# Patient Record
Sex: FEMALE | Race: BLACK OR AFRICAN AMERICAN | NOT HISPANIC OR LATINO | Employment: UNEMPLOYED | URBAN - NONMETROPOLITAN AREA
[De-identification: names, ages, dates, MRNs, and addresses within clinical notes are randomized per-mention and may not be internally consistent; named-entity substitution may affect disease eponyms.]

---
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
## 2021-07-02 ENCOUNTER — HOSPITAL ENCOUNTER (EMERGENCY)
Facility: HOSPITAL | Age: 21
Discharge: LEFT BEFORE DISCHARGE | End: 2021-07-02

## 2021-07-02 VITALS
OXYGEN SATURATION: 97 % | RESPIRATION RATE: 16 BRPM | HEIGHT: 70 IN | BODY MASS INDEX: 41.95 KG/M2 | DIASTOLIC BLOOD PRESSURE: 83 MMHG | SYSTOLIC BLOOD PRESSURE: 133 MMHG | WEIGHT: 293 LBS | TEMPERATURE: 97.6 F | HEART RATE: 98 BPM

## 2021-07-02 PROCEDURE — 99211 OFF/OP EST MAY X REQ PHY/QHP: CPT

## 2021-08-10 ENCOUNTER — OFFICE VISIT (OUTPATIENT)
Dept: OBSTETRICS AND GYNECOLOGY | Facility: CLINIC | Age: 21
End: 2021-08-10

## 2021-08-10 VITALS
SYSTOLIC BLOOD PRESSURE: 128 MMHG | WEIGHT: 293 LBS | HEIGHT: 70 IN | DIASTOLIC BLOOD PRESSURE: 78 MMHG | BODY MASS INDEX: 41.95 KG/M2

## 2021-08-10 DIAGNOSIS — Z12.4 SCREENING FOR CERVICAL CANCER: ICD-10-CM

## 2021-08-10 DIAGNOSIS — Z11.3 ROUTINE SCREENING FOR STI (SEXUALLY TRANSMITTED INFECTION): ICD-10-CM

## 2021-08-10 DIAGNOSIS — N94.6 DYSMENORRHEA: ICD-10-CM

## 2021-08-10 DIAGNOSIS — Z01.419 ENCOUNTER FOR GYNECOLOGICAL EXAMINATION WITHOUT ABNORMAL FINDING: Primary | ICD-10-CM

## 2021-08-10 DIAGNOSIS — N94.10 DYSPAREUNIA IN FEMALE: ICD-10-CM

## 2021-08-10 PROCEDURE — 99385 PREV VISIT NEW AGE 18-39: CPT | Performed by: PHYSICIAN ASSISTANT

## 2021-08-10 NOTE — PROGRESS NOTES
Subjective   Chief Complaint   Patient presents with   • Gynecologic Exam     Annual and pap, last pap 2018 WNL. C/O pain with periods and sex. Patient would like to discuss having a baby       Renuka Bety Greer is a 20 y.o. year old  presenting to be seen for her annual gynecological exam.   Patient's last menstrual period was 2021.  Patient reports that she has not used any contraception for 3 years and has been unable to conceive.  Her  is 24 years old and reportedly healthy.  She has been experiencing deep dyspareunia since 2018.  Reports that she has moderate to severe dysmenorrhea.  Her menstrual cycles occur every 28 to 30 days most of the time, she states there has been an occasional month that she would have 2 cycles per month.  Her flow lasts for 5 days.  She would like that STI screening with her Pap smear.    Past Medical History:   Diagnosis Date   • Urinary tract infection       No current outpatient medications on file.   No Known Allergies   History reviewed. No pertinent surgical history.   Social History     Socioeconomic History   • Marital status: Single     Spouse name: Not on file   • Number of children: Not on file   • Years of education: Not on file   • Highest education level: Not on file   Tobacco Use   • Smoking status: Never Smoker   • Smokeless tobacco: Never Used   Vaping Use   • Vaping Use: Never used   Substance and Sexual Activity   • Alcohol use: Yes     Comment: socially    • Drug use: Yes     Types: Marijuana   • Sexual activity: Yes     Partners: Male      Family History   Problem Relation Age of Onset   • Diabetes Father    • Hypertension Father    • Stroke Maternal Grandmother    • Prostate cancer Maternal Grandfather        Review of Systems   Constitutional:        Weight gain   Gastrointestinal: Negative.    Genitourinary: Positive for dyspareunia, menstrual problem and pelvic pain. Negative for vaginal discharge.   All other systems reviewed and are  "negative.          Objective   /78   Ht 177.8 cm (70\")   Wt (!) 153 kg (337 lb)   LMP 07/30/2021   Breastfeeding No   BMI 48.35 kg/m²     Physical Exam  Constitutional:       Appearance: Normal appearance. She is well-developed and well-groomed. She is morbidly obese.   Eyes:      General: Lids are normal.      Extraocular Movements: Extraocular movements intact.      Conjunctiva/sclera: Conjunctivae normal.   Neck:      Thyroid: No thyroid mass or thyromegaly.   Chest:      Breasts: Breasts are symmetrical.         Right: No inverted nipple, mass, nipple discharge, skin change or tenderness.         Left: No inverted nipple, mass, nipple discharge, skin change or tenderness.   Abdominal:      General: There is no distension.      Palpations: Abdomen is soft. There is no hepatomegaly or splenomegaly.      Tenderness: There is no abdominal tenderness.   Genitourinary:     Exam position: Lithotomy position.      Labia:         Right: No rash, tenderness or lesion.         Left: No rash, tenderness or lesion.       Urethra: No prolapse, urethral pain, urethral swelling or urethral lesion.      Vagina: No vaginal discharge, tenderness or lesions.      Cervix: No cervical motion tenderness, discharge, friability or lesion.      Uterus: Not enlarged and not tender.       Adnexa:         Right: No mass or tenderness.          Left: No mass or tenderness.     Musculoskeletal:      Cervical back: Neck supple.   Lymphadenopathy:      Upper Body:      Right upper body: No axillary adenopathy.      Left upper body: No axillary adenopathy.   Skin:     General: Skin is warm and dry.      Findings: No lesion.   Neurological:      General: No focal deficit present.      Mental Status: She is alert and oriented to person, place, and time.   Psychiatric:         Attention and Perception: Attention normal.         Mood and Affect: Mood normal.         Speech: Speech normal.         Behavior: Behavior normal.         Thought " Content: Thought content normal.            Result Review :                   Assessment and Plan  Diagnoses and all orders for this visit:    1. Encounter for gynecological examination without abnormal finding (Primary)    2. Screening for cervical cancer  -     Pap IG, Ct-Ng, Rfx HPV ASCU; Future    3. Routine screening for STI (sexually transmitted infection)  -     Pap IG, Ct-Ng, Rfx HPV ASCU; Future    4. Dyspareunia in female  -     US Non-ob Transvaginal    5. Dysmenorrhea  -     US Non-ob Transvaginal      Patient Instructions   Encourage self breast exam monthly  Regular exercise             This note was electronically signed.    Geraldine Ortega PA-C   August 11, 2021

## 2021-08-20 DIAGNOSIS — Z11.3 ROUTINE SCREENING FOR STI (SEXUALLY TRANSMITTED INFECTION): ICD-10-CM

## 2021-08-20 DIAGNOSIS — Z12.4 SCREENING FOR CERVICAL CANCER: ICD-10-CM

## 2021-08-25 ENCOUNTER — OFFICE VISIT (OUTPATIENT)
Dept: OBSTETRICS AND GYNECOLOGY | Facility: CLINIC | Age: 21
End: 2021-08-25

## 2021-08-25 VITALS
SYSTOLIC BLOOD PRESSURE: 134 MMHG | WEIGHT: 293 LBS | HEIGHT: 70 IN | DIASTOLIC BLOOD PRESSURE: 82 MMHG | BODY MASS INDEX: 41.95 KG/M2

## 2021-08-25 DIAGNOSIS — N83.292 COMPLEX CYST OF LEFT OVARY: Primary | ICD-10-CM

## 2021-08-25 DIAGNOSIS — N94.6 DYSMENORRHEA: ICD-10-CM

## 2021-08-25 DIAGNOSIS — N94.10 DYSPAREUNIA IN FEMALE: ICD-10-CM

## 2021-08-25 DIAGNOSIS — Z82.49 FAMILY HISTORY OF DVT: ICD-10-CM

## 2021-08-25 PROCEDURE — 99214 OFFICE O/P EST MOD 30 MIN: CPT | Performed by: PHYSICIAN ASSISTANT

## 2021-08-25 NOTE — PROGRESS NOTES
Subjective   Chief Complaint   Patient presents with   • Follow-up     TVS for dysmenorrhea       Renuka Bety Greer is a 20 y.o. year old  presenting to be seen for follow-up dysmenorrhea.  She was seen recently with complaint of dysmenorrhea.  Also deep dyspareunia which had been an issue since 2018.  She reported unprotected sex for 3 years and has not conceived.  States it is okay if she were to get pregnant at this time.  Transvaginal pelvic ultrasound done today is reviewed with patient.  It notes anteverted normal-appearing uterus.  Her endometrium is 12.7 mm (LMP 2021).  Right ovary has small follicles.  The left ovary has a 2.7 cm complex cyst with thick fluid.  Bilateral ovaries have normal vascular flow.  There is a small amount of free fluid seen.  Patient's mother is in the office today for patient's visit as well.  Mother reports that there is a family history of DVTs.  The mother had a DVT at age 39.  She was not pregnant or on any type of hormones when she had the DVT.  The patient's biological brother had a DVT at age 18.  The maternal grandfather also had had DVTs.  With the patient's symptoms and small complex cyst we discussed the possibility of hormonal suppression of her ovaries.  However the family history of DVTs patient will need to be screened for coagulation disorders prior to starting any hormonal birth control.    Past Medical History:   Diagnosis Date   • Urinary tract infection       No current outpatient medications on file.   No Known Allergies   History reviewed. No pertinent surgical history.   Social History     Socioeconomic History   • Marital status: Single     Spouse name: Not on file   • Number of children: Not on file   • Years of education: Not on file   • Highest education level: Not on file   Tobacco Use   • Smoking status: Never Smoker   • Smokeless tobacco: Never Used   Vaping Use   • Vaping Use: Never used   Substance and Sexual Activity   • Alcohol use:  "Yes     Comment: socially    • Drug use: Yes     Types: Marijuana   • Sexual activity: Yes     Partners: Male      Family History   Problem Relation Age of Onset   • Diabetes Father    • Hypertension Father    • Stroke Maternal Grandmother    • Prostate cancer Maternal Grandfather        Review of Systems   Constitutional: Negative for chills, diaphoresis and fever.   Gastrointestinal: Negative.    Genitourinary: Positive for dyspareunia and menstrual problem. Negative for difficulty urinating, dysuria, vaginal bleeding and vaginal discharge.           Objective   /82   Ht 177.8 cm (70\")   Wt (!) 153 kg (337 lb)   LMP 07/30/2021   Breastfeeding No   BMI 48.35 kg/m²     Physical Exam       Result Review :   The following data was reviewed by: Geraldine Ortega PA-C on 08/25/2021:    Data reviewed: pelvic ultrasound images            Assessment and Plan  Diagnoses and all orders for this visit:    1. Complex cyst of left ovary (Primary)    2. Dyspareunia in female    3. Dysmenorrhea    4. Family history of DVT  -     Factor 5 [place]iden; Future  -     Anticardiolipin Antibody, IgG / M, Qn; Future  -     Lupus Anticoagulant Panel; Future  -     Protein C Deficiency Profile; Future  -     Protein S Antigen, Free; Future  -     MTHFR Mutation; Future      Patient Instructions   We will check labs today to screen for coagulopathies.  Patient will be contacted with results when available.  Pending results we will further discuss hormonal options to improve her complaints.             This note was electronically signed.    Geraldine Ortega PA-C   August 26, 2021  "

## 2021-08-26 NOTE — PATIENT INSTRUCTIONS
We will check labs today to screen for coagulopathies.  Patient will be contacted with results when available.  Pending results we will further discuss hormonal options to improve her complaints.

## 2023-04-16 PROBLEM — Z01.419 WELL WOMAN EXAM: Status: ACTIVE | Noted: 2023-04-16

## 2023-04-20 ENCOUNTER — LAB (OUTPATIENT)
Dept: LAB | Facility: HOSPITAL | Age: 23
End: 2023-04-20
Payer: MEDICAID

## 2023-04-20 ENCOUNTER — OFFICE VISIT (OUTPATIENT)
Dept: OBSTETRICS AND GYNECOLOGY | Facility: CLINIC | Age: 23
End: 2023-04-20
Payer: MEDICAID

## 2023-04-20 VITALS
WEIGHT: 289 LBS | DIASTOLIC BLOOD PRESSURE: 70 MMHG | RESPIRATION RATE: 14 BRPM | SYSTOLIC BLOOD PRESSURE: 124 MMHG | BODY MASS INDEX: 41.47 KG/M2

## 2023-04-20 DIAGNOSIS — Z11.3 SCREENING FOR VENEREAL DISEASE: ICD-10-CM

## 2023-04-20 DIAGNOSIS — N92.6 IRREGULAR MENSES: Primary | ICD-10-CM

## 2023-04-20 DIAGNOSIS — Z31.69 ENCOUNTER FOR PRECONCEPTION CONSULTATION: ICD-10-CM

## 2023-04-20 PROCEDURE — 87591 N.GONORRHOEAE DNA AMP PROB: CPT

## 2023-04-20 PROCEDURE — 87491 CHLMYD TRACH DNA AMP PROBE: CPT

## 2023-04-20 NOTE — PATIENT INSTRUCTIONS
Preventing Birth Defects with Folic Acid  What is folic acid?  Folic acid is a vitamin that is used by the body to create new cells and keep the blood healthy. Everyone needs folic acid to stay healthy. It is especially important if you are pregnant.  Folic acid is artificial (synthetic). The vitamin in its natural form is called folate. Some foods are natural sources of folate, and other foods have folic acid added to them (fortified foods). You can also buy folic acid supplements or vitamins that contain folic acid.  Why is folic acid important during pregnancy?  Folic acid helps reduce your baby's risk of serious birth defects, especially the class of birth defects called neural tube defects (NTD).  Types of NTD's include:  Spina bifida. Spina bifida occurs when a baby's spinal column does not develop completely. This can cause serious, long-term (chronic) disabilities.  Anencephaly. This is a birth defect that causes your baby to be missing parts of the brain, scalp, and skull when he or she is born. Most babies born with anencephaly only live for a short amount of time.  How much folic acid do I need?  If you plan to become pregnant, you should take at least 400 mcg (micrograms) of folic acid daily. Birth defects usually occur in the earliest stages of pregnancy, often before you know you are pregnant. Taking folic acid when you start trying to become pregnant can help prevent birth defects.  While you are pregnant, you need at least 600 mcg of folic acid each day.  If you have had a child with a NTD, you may be recommended you take at least 5,000 mcg of folic acid daily.  If you take certain medications (certain medications for control of seizures) you made need more the 400 mcg.  What nutrition changes can be made?  To increase your intake of folate and folic acid, you may:  Eat more foods that are fortified with folic acid. Check food labels to see whether a food contains folic acid. Foods that are commonly  fortified with folic acid include:  Cereals.  Pastas.  Flours.  White rice.  Breads.  Eat more foods that are natural sources of folate, such as:  Legumes, including lentils, peas, and beans.  Nuts.  Vegetables, especially dark green leafy vegetables, such as spinach and mustard greens.  Citrus fruits and juices.  Your health care provider may still recommend that you take a supplement to make sure that you get enough to reduce the risk of birth defects.  Where to find support  Talk with your health care provider or your pharmacist to find a folic acid supplement that is right for you.  Your health care provider may recommend that you see a nutrition specialist (nutritionist). A nutritionist can help you make healthy food choices and get more folate from your diet.  Nutrition education programs may be available through your community health department.  Where to find more information  Visit the following websites to learn more about the importance of folic acid.  Centers for Disease Control and Prevention: www.cdc.gov/ncbddd/folicacid/about.html  The Office on Women's Health: womenshRamamiath.gov/publications/our-publications/fact-sheet/folic-acid.html  National Institutes of Health: ods.od.nih.gov/factsheets/Folate-Consumer  The March of Dimes: www.marchofdimes.org/pregnancy/folic-acid.aspx  Summary  It is important to start taking folic acid when you are planning to become pregnant, because many birth defects can happen before you know that you are pregnant.  You can get more folate and folic acid from your diet. Look for certain foods that are fortified with folic acid.  Your health care provider may recommend that you take a supplement to get enough folic acid.    This information is not intended to replace advice given to you by your health care provider. Make sure you discuss any questions you have with your health care provider.  Document Released: 01/18/2017 Document Revised: 11/30/2018 Document Reviewed:  01/18/2017  Elsevier Patient Education © 2020 [employer].

## 2023-04-20 NOTE — PROGRESS NOTES
Subjective   Chief Complaint   Patient presents with   • Gynecologic Exam       Renuka Greer is a 22 y.o. year old .  Patient's last menstrual period was 04/15/2023 (approximate).  She comes to establish care.  She is having no real issues.  She has been told historically she has a bunch of small cyst on her ovary and comes to get that checked out.  She is sexually monogamous.  They are using no forms of contraception.  She is not actively trying to get pregnant but she is not preventing.  She does not take a folic acid supplement.  Her cycles are not predictably regular.    The following portions of the patient's history were reviewed and updated as appropriate:current medications and allergies    Social History    Tobacco Use      Smoking status: Never      Smokeless tobacco: Never         Objective   /70   Resp 14   Wt 131 kg (289 lb)   LMP 04/15/2023 (Approximate)   BMI 41.47 kg/m²     Lab Review   PAP    Imaging   Pelvic ultrasound report        Assessment   1. Irregular menses most likely related to chronic anovulation  2. Obesity most likely contributing to her chronic anovulation     Plan   1. The following tests were ordered today: STD testing (GC/CHL) by urine.  It was explained to Renuka that all lab test should be back within the one week after they are performed. She will be notified about the results, regardless of the findings. If she has not been contacted by the office within 2 weeks after the test has been performed, it is her responsibility to contact us to learn about her results.  2. Folic acid for the prevention of neural tube defects was discussed.  At least 0.4 mg should be taken preconceptionally to reduce the risk.  It was explained that this may reduce the baseline incidence of neural tube defect by as much as 65%.  Folic acid can be found in OTC multivitamins, OTC prenatal vitamins or breakfast cereal that that contains 100% of the RDA of folate.  3. The  importance of keeping all planned follow-up and taking all medications as prescribed was emphasized.  4. Follow up PRN          This note was electronically signed.    Raúl Merlos M.D.  April 20, 2023    Total time spent today with Renuka  was 30-44 minutes (level 3) - pathophysiology of her presenting problem along with plans for any diagnositc work-up and treatment.    Part of this note may be an electronic transcription/translation of spoken language to printed text using the Dragon Dictation System.

## 2023-04-24 LAB
C TRACH RRNA SPEC QL NAA+PROBE: NEGATIVE
N GONORRHOEA RRNA SPEC QL NAA+PROBE: NEGATIVE

## 2023-06-13 ENCOUNTER — HOSPITAL ENCOUNTER (EMERGENCY)
Facility: HOSPITAL | Age: 23
Discharge: HOME | End: 2023-06-13
Attending: EMERGENCY MEDICINE | Admitting: EMERGENCY MEDICINE
Payer: MEDICAID

## 2023-06-13 ENCOUNTER — APPOINTMENT (OUTPATIENT)
Dept: CT IMAGING | Facility: HOSPITAL | Age: 23
End: 2023-06-13
Payer: MEDICAID

## 2023-06-13 VITALS
RESPIRATION RATE: 20 BRPM | BODY MASS INDEX: 41.95 KG/M2 | OXYGEN SATURATION: 98 % | HEIGHT: 70 IN | SYSTOLIC BLOOD PRESSURE: 130 MMHG | HEART RATE: 85 BPM | WEIGHT: 293 LBS | DIASTOLIC BLOOD PRESSURE: 95 MMHG | TEMPERATURE: 98 F

## 2023-06-13 DIAGNOSIS — N93.9 ABNORMAL UTERINE BLEEDING: ICD-10-CM

## 2023-06-13 DIAGNOSIS — R10.9 ACUTE ABDOMINAL PAIN: Primary | ICD-10-CM

## 2023-06-13 DIAGNOSIS — K62.5 RECTAL BLEEDING: ICD-10-CM

## 2023-06-13 LAB
ALBUMIN SERPL-MCNC: 3.8 G/DL (ref 3.5–5.2)
ALBUMIN/GLOB SERPL: 1 G/DL
ALP SERPL-CCNC: 89 U/L (ref 39–117)
ALT SERPL W P-5'-P-CCNC: 6 U/L (ref 1–33)
ANION GAP SERPL CALCULATED.3IONS-SCNC: 9 MMOL/L (ref 5–15)
AST SERPL-CCNC: 11 U/L (ref 1–32)
B-HCG UR QL: NEGATIVE
BACTERIA UR QL AUTO: ABNORMAL /HPF
BASOPHILS # BLD AUTO: 0.02 10*3/MM3 (ref 0–0.2)
BASOPHILS NFR BLD AUTO: 0.3 % (ref 0–1.5)
BILIRUB SERPL-MCNC: 0.4 MG/DL (ref 0–1.2)
BILIRUB UR QL STRIP: NEGATIVE
BUN SERPL-MCNC: 10 MG/DL (ref 6–20)
BUN/CREAT SERPL: 17.2 (ref 7–25)
CALCIUM SPEC-SCNC: 9.3 MG/DL (ref 8.6–10.5)
CHLORIDE SERPL-SCNC: 105 MMOL/L (ref 98–107)
CLARITY UR: CLEAR
CO2 SERPL-SCNC: 21 MMOL/L (ref 22–29)
COLOR UR: YELLOW
CREAT SERPL-MCNC: 0.58 MG/DL (ref 0.57–1)
DEPRECATED RDW RBC AUTO: 43.3 FL (ref 37–54)
EGFRCR SERPLBLD CKD-EPI 2021: 131.4 ML/MIN/1.73
EOSINOPHIL # BLD AUTO: 0.08 10*3/MM3 (ref 0–0.4)
EOSINOPHIL NFR BLD AUTO: 1.1 % (ref 0.3–6.2)
ERYTHROCYTE [DISTWIDTH] IN BLOOD BY AUTOMATED COUNT: 13.2 % (ref 12.3–15.4)
EXPIRATION DATE: NORMAL
GLOBULIN UR ELPH-MCNC: 4 GM/DL
GLUCOSE SERPL-MCNC: 91 MG/DL (ref 65–99)
GLUCOSE UR STRIP-MCNC: NEGATIVE MG/DL
HCT VFR BLD AUTO: 39.2 % (ref 34–46.6)
HGB BLD-MCNC: 12.5 G/DL (ref 12–15.9)
HGB UR QL STRIP.AUTO: NEGATIVE
HOLD SPECIMEN: NORMAL
HYALINE CASTS UR QL AUTO: ABNORMAL /LPF
IMM GRANULOCYTES # BLD AUTO: 0.02 10*3/MM3 (ref 0–0.05)
IMM GRANULOCYTES NFR BLD AUTO: 0.3 % (ref 0–0.5)
INTERNAL NEGATIVE CONTROL: NEGATIVE
INTERNAL POSITIVE CONTROL: POSITIVE
KETONES UR QL STRIP: NEGATIVE
LEUKOCYTE ESTERASE UR QL STRIP.AUTO: ABNORMAL
LIPASE SERPL-CCNC: 19 U/L (ref 13–60)
LYMPHOCYTES # BLD AUTO: 2.23 10*3/MM3 (ref 0.7–3.1)
LYMPHOCYTES NFR BLD AUTO: 31.6 % (ref 19.6–45.3)
Lab: NORMAL
MCH RBC QN AUTO: 28.7 PG (ref 26.6–33)
MCHC RBC AUTO-ENTMCNC: 31.9 G/DL (ref 31.5–35.7)
MCV RBC AUTO: 90.1 FL (ref 79–97)
MONOCYTES # BLD AUTO: 0.45 10*3/MM3 (ref 0.1–0.9)
MONOCYTES NFR BLD AUTO: 6.4 % (ref 5–12)
NEUTROPHILS NFR BLD AUTO: 4.25 10*3/MM3 (ref 1.7–7)
NEUTROPHILS NFR BLD AUTO: 60.3 % (ref 42.7–76)
NITRITE UR QL STRIP: NEGATIVE
NRBC BLD AUTO-RTO: 0 /100 WBC (ref 0–0.2)
PH UR STRIP.AUTO: 6 [PH] (ref 5–8)
PLATELET # BLD AUTO: 320 10*3/MM3 (ref 140–450)
PMV BLD AUTO: 10 FL (ref 6–12)
POTASSIUM SERPL-SCNC: 4.3 MMOL/L (ref 3.5–5.2)
PROT SERPL-MCNC: 7.8 G/DL (ref 6–8.5)
PROT UR QL STRIP: NEGATIVE
RBC # BLD AUTO: 4.35 10*6/MM3 (ref 3.77–5.28)
RBC # UR STRIP: ABNORMAL /HPF
REF LAB TEST METHOD: ABNORMAL
SODIUM SERPL-SCNC: 135 MMOL/L (ref 136–145)
SP GR UR STRIP: 1.02 (ref 1–1.03)
SQUAMOUS #/AREA URNS HPF: ABNORMAL /HPF
UROBILINOGEN UR QL STRIP: ABNORMAL
WBC # UR STRIP: ABNORMAL /HPF
WBC NRBC COR # BLD: 7.05 10*3/MM3 (ref 3.4–10.8)
WHOLE BLOOD HOLD COAG: NORMAL
WHOLE BLOOD HOLD SPECIMEN: NORMAL

## 2023-06-13 PROCEDURE — 74176 CT ABD & PELVIS W/O CONTRAST: CPT

## 2023-06-13 PROCEDURE — 81001 URINALYSIS AUTO W/SCOPE: CPT | Performed by: EMERGENCY MEDICINE

## 2023-06-13 PROCEDURE — 81025 URINE PREGNANCY TEST: CPT | Performed by: EMERGENCY MEDICINE

## 2023-06-13 PROCEDURE — 36415 COLL VENOUS BLD VENIPUNCTURE: CPT

## 2023-06-13 PROCEDURE — 85025 COMPLETE CBC W/AUTO DIFF WBC: CPT | Performed by: EMERGENCY MEDICINE

## 2023-06-13 PROCEDURE — 80053 COMPREHEN METABOLIC PANEL: CPT | Performed by: EMERGENCY MEDICINE

## 2023-06-13 PROCEDURE — 83690 ASSAY OF LIPASE: CPT | Performed by: EMERGENCY MEDICINE

## 2023-06-13 PROCEDURE — 99284 EMERGENCY DEPT VISIT MOD MDM: CPT

## 2023-06-13 RX ORDER — SODIUM CHLORIDE 9 MG/ML
10 INJECTION INTRAVENOUS AS NEEDED
Status: DISCONTINUED | OUTPATIENT
Start: 2023-06-13 | End: 2023-06-14 | Stop reason: HOSPADM

## 2023-06-13 NOTE — ED PROVIDER NOTES
Subjective   History of Present Illness  Ms. Greer presents with vaginal bleeding and rectal bleeding.  She has had rectal bleeding intermittently for several weeks.  She sees [facility] gastro[facility].  She last saw them on the fifth of this month.  They have scheduled her for colonoscopy.  She tells me she continues to see bright red blood per rectum.  It only occurs with bowel movement.  Her bowel movements are soft.  She notes 2 weeks of lower abdominal pain and cramping.  She tells me her brother who is 4 years older has been diagnosed with colon cancer.  She also reports that she recently finished her period and is still having bleeding which is unusual.       Review of her records indicates that she sees Dr. Cortes here for irregular periods.  She last saw him in April of this year.  She takes no medications and has had no prior surgeries.    Review of Systems    No past medical history on file.    No Known Allergies    No past surgical history on file.    Family History   Problem Relation Age of Onset    Diabetes Father     Hypertension Father     Colon cancer Brother     Stroke Maternal Grandmother     Prostate cancer Maternal Grandfather     Colon cancer Maternal Grandfather        Social History     Socioeconomic History    Marital status: Single   Tobacco Use    Smoking status: Never    Smokeless tobacco: Never   Vaping Use    Vaping Use: Never used   Substance and Sexual Activity    Alcohol use: Yes     Comment: socially     Drug use: Yes     Types: Marijuana    Sexual activity: Yes     Partners: Male           Objective   Physical Exam  Vitals and nursing note reviewed.   Constitutional:       General: She is not in acute distress.     Appearance: Normal appearance. She is obese.   HENT:      Head: Normocephalic and atraumatic.      Nose: Nose normal. No congestion or rhinorrhea.   Eyes:      General: No scleral icterus.     Conjunctiva/sclera: Conjunctivae normal.   Neck:       Comments: No JVD   Cardiovascular:      Rate and Rhythm: Normal rate and regular rhythm.      Heart sounds: No murmur heard.    No friction rub.   Pulmonary:      Effort: Pulmonary effort is normal.      Breath sounds: Normal breath sounds. No wheezing or rales.   Abdominal:      General: Bowel sounds are normal.      Palpations: Abdomen is soft.      Tenderness: There is abdominal tenderness. There is no guarding or rebound.      Comments: She is tender to palpate in the right lower quadrant   Genitourinary:     Comments: There is a tiny fissure adjacent to the anus.  There are no hemorrhoids  Musculoskeletal:         General: No tenderness.      Cervical back: Normal range of motion and neck supple.      Right lower leg: No edema.      Left lower leg: No edema.   Skin:     General: Skin is warm and dry.      Coloration: Skin is not pale.      Findings: No erythema.   Neurological:      General: No focal deficit present.      Mental Status: She is alert and oriented to person, place, and time.      Motor: No weakness.      Coordination: Coordination normal.   Psychiatric:         Mood and Affect: Mood normal.         Behavior: Behavior normal.         Thought Content: Thought content normal.       Procedures           ED Course  ED Course as of 06/14/23 0129   Tue Jun 13, 2023 2239 I spoke with Ms. Greer about findings.  I performed a rectal exam in the presence of her nurse.  There is a tiny fissure at the 2 o'clock position.  Its not actively bleeding.  I do not see hemorrhoids or any other fissures.  I encouraged her to follow through with her plans for colonoscopy at [facility].  She tells me she no longer sees Dr. Cortes, will refer her to follow-up with Dr. Clifford who is on-call. [DT]      ED Course User Index  [DT] Benitez Coronel MD                                           Medical Decision Making  Please see course notes.  Differential diagnosis would include many things that cause  rectal bleeding.  I ordered and interpreted multiple labs showing no significant blood loss.  I ordered and reviewed CAT scan which showed no intra-abdominal masses or source of abnormal vaginal and rectal bleeding.    Problems Addressed:  Abnormal uterine bleeding: complicated acute illness or injury  Acute abdominal pain: complicated acute illness or injury that poses a threat to life or bodily functions  Rectal bleeding: complicated acute illness or injury that poses a threat to life or bodily functions    Amount and/or Complexity of Data Reviewed  Labs: ordered. Decision-making details documented in ED Course.  Radiology: ordered. Decision-making details documented in ED Course.    Risk  Prescription drug management.        Final diagnoses:   Acute abdominal pain   Rectal bleeding   Abnormal uterine bleeding       ED Disposition  ED Disposition       ED Disposition   Discharge    Condition   Stable    Comment   --               PATIENT CONNECTION - [place] [address]  [phone]        Monet Clifford MD  [address] [facility] [address] [place] [address]  [phone]               Medication List      No changes were made to your prescriptions during this visit.            Benitez Coronel MD  06/14/23 [id number]

## 2023-06-13 NOTE — Clinical Note
[facility] EMERGENCY DEPARTMENT  [address] [facility] [address]  Phone: [phone]    Renuka Greer was seen and treated in our emergency department on 6/13/2023.  She may return to work on 06/16/2023.         Thank you for choosing [facility].    Benitez Coronel MD

## 2023-06-14 NOTE — DISCHARGE INSTRUCTIONS
Drink extra fluids, take stool softeners.  It is important that you have a primary care provider to follow-up with.  Call the patient connection number to get an appointment to see a primary care provider.  It is also important for you to follow with gynecology.  Call Dr. Clifford to arrange follow-up for your irregular periods.  Continue to follow with the gastroenterologist at [facility].